# Patient Record
Sex: MALE | Race: OTHER | HISPANIC OR LATINO
[De-identification: names, ages, dates, MRNs, and addresses within clinical notes are randomized per-mention and may not be internally consistent; named-entity substitution may affect disease eponyms.]

---

## 2024-09-08 PROBLEM — Z00.00 ENCOUNTER FOR PREVENTIVE HEALTH EXAMINATION: Status: ACTIVE | Noted: 2024-09-08

## 2024-09-10 ENCOUNTER — NON-APPOINTMENT (OUTPATIENT)
Age: 24
End: 2024-09-10

## 2024-09-11 ENCOUNTER — APPOINTMENT (OUTPATIENT)
Dept: PULMONOLOGY | Facility: CLINIC | Age: 24
End: 2024-09-11
Payer: MEDICAID

## 2024-09-11 VITALS
WEIGHT: 120 LBS | HEART RATE: 95 BPM | DIASTOLIC BLOOD PRESSURE: 70 MMHG | BODY MASS INDEX: 17.77 KG/M2 | TEMPERATURE: 98.4 F | OXYGEN SATURATION: 99 % | SYSTOLIC BLOOD PRESSURE: 120 MMHG | HEIGHT: 69 IN

## 2024-09-11 DIAGNOSIS — R06.00 DYSPNEA, UNSPECIFIED: ICD-10-CM

## 2024-09-11 PROCEDURE — 99203 OFFICE O/P NEW LOW 30 MIN: CPT

## 2024-09-11 NOTE — HISTORY OF PRESENT ILLNESS
[TextBox_4] :  23 year old patient presents for evaluation of shortness of breath  throat tightness  mucus in throat  He had COVID-19 infection 1 month ago but symptoms predate the  COVID-19 infection   he does hear some abnormal breath sounds  he has increased symptoms with activity  No  history of obstructive airway disease      Primary doctor is Dr Corwin Siddiqui     PSH:  none       PMH:  denies     SH:   never smoker   ETOH:  occasional     Occupation: none   No exposure to chemicals, dust, asbestos, mold lives in apartment on 2nd floor  has a bird, 2 years    ALLERGY: penicilin   environmental/seasonal allergy: pollen, does not take meds       Review of Systems:   No rash, skin problems No dry eyes no mouth ulcers no sinusitis, sinus infections, nasal obstruction no dysphagia no dry mouth   no arthritis no joint aches no joint swelling    no  history of obstructive airway disease  no pneumonia  no lung cancer   no CAD no MI  no murmur no CHF no HTN no edema   no peptic ulcer or gastritis no GERD no abdominal pain no liver disease   no Diabetes no thyroid disease no hyperlipidemia    no bleeding   no DVT or PE   no kidney disease   no stroke no seizure

## 2024-09-11 NOTE — PHYSICAL EXAM
[No Acute Distress] : no acute distress [Normal Oropharynx] : normal oropharynx [I] : Mallampati Class: I [Normal Appearance] : normal appearance [No Neck Mass] : no neck mass [Normal Rate/Rhythm] : normal rate/rhythm [Normal S1, S2] : normal s1, s2 [No Murmurs] : no murmurs [No Resp Distress] : no resp distress [Clear to Auscultation Bilaterally] : clear to auscultation bilaterally [No Abnormalities] : no abnormalities [Benign] : benign [Normal Gait] : normal gait [No Clubbing] : no clubbing [No Cyanosis] : no cyanosis [No Edema] : no edema [FROM] : FROM [Normal Color/ Pigmentation] : normal color/ pigmentation [No Focal Deficits] : no focal deficits [Oriented x3] : oriented x3 [Normal Affect] : normal affect [TextBox_2] : thin, NAD

## 2024-09-11 NOTE — DISCUSSION/SUMMARY
[FreeTextEntry1] : 23 year old patient presents for evaluation of shortness of breath  throat tightness  mucus in throat  He had COVID-19 infection 1 month ago but symptoms predate the  COVID-19 infection   he does hear some abnormal breath sounds  he has increased symptoms with activity  No  history of obstructive airway disease    he has clear lungs on exam including with forced exhalation  PLAN  Evaluate further with PFT  Obtain CXR  He plans to have cardiologist as well  Further recommendations based on results.  El Bills MD

## 2024-09-23 ENCOUNTER — APPOINTMENT (OUTPATIENT)
Dept: PULMONOLOGY | Facility: CLINIC | Age: 24
End: 2024-09-23

## 2024-09-23 PROCEDURE — XXXXX: CPT | Mod: 1L

## 2024-09-30 ENCOUNTER — APPOINTMENT (OUTPATIENT)
Dept: PULMONOLOGY | Facility: CLINIC | Age: 24
End: 2024-09-30
Payer: MEDICAID

## 2024-09-30 VITALS
WEIGHT: 123 LBS | SYSTOLIC BLOOD PRESSURE: 118 MMHG | DIASTOLIC BLOOD PRESSURE: 72 MMHG | TEMPERATURE: 98.4 F | HEART RATE: 66 BPM | OXYGEN SATURATION: 98 %

## 2024-09-30 DIAGNOSIS — R06.00 DYSPNEA, UNSPECIFIED: ICD-10-CM

## 2024-09-30 PROCEDURE — 99214 OFFICE O/P EST MOD 30 MIN: CPT

## 2024-09-30 RX ORDER — ALBUTEROL SULFATE 90 UG/1
108 (90 BASE) INHALANT RESPIRATORY (INHALATION)
Qty: 1 | Refills: 4 | Status: ACTIVE | COMMUNITY
Start: 2024-09-30 | End: 1900-01-01

## 2024-09-30 NOTE — DISCUSSION/SUMMARY
[FreeTextEntry1] : 23 year old patient presents for evaluation of shortness of breath  throat tightness  mucus in throat  He had COVID-19 infection 1 month ago but symptoms predate the  COVID-19 infection   he does hear some abnormal breath sounds  he has increased symptoms with activity  No  history of obstructive airway disease    he has clear lungs on exam including with forced exhalation  PFT  was   unremarkable   CXR   unremarkable   He plans to have cardiologist as well  Will see allergist  PLAN  Trial of inhaled bronchodilator   complete cardiologist workup  Further recommendations based on results.  El Bills MD

## 2024-12-17 ENCOUNTER — APPOINTMENT (OUTPATIENT)
Dept: PULMONOLOGY | Facility: CLINIC | Age: 24
End: 2024-12-17